# Patient Record
Sex: FEMALE | Race: WHITE | NOT HISPANIC OR LATINO
[De-identification: names, ages, dates, MRNs, and addresses within clinical notes are randomized per-mention and may not be internally consistent; named-entity substitution may affect disease eponyms.]

---

## 2018-06-12 ENCOUNTER — APPOINTMENT (OUTPATIENT)
Dept: PLASTIC SURGERY | Facility: CLINIC | Age: 53
End: 2018-06-12
Payer: SELF-PAY

## 2018-06-12 VITALS
DIASTOLIC BLOOD PRESSURE: 80 MMHG | HEART RATE: 65 BPM | RESPIRATION RATE: 20 BRPM | SYSTOLIC BLOOD PRESSURE: 122 MMHG | OXYGEN SATURATION: 96 %

## 2018-06-12 PROBLEM — Z00.00 ENCOUNTER FOR PREVENTIVE HEALTH EXAMINATION: Status: ACTIVE | Noted: 2018-06-12

## 2018-06-12 PROCEDURE — 11950 SUBQ NJX FILLING MATRL 1CC/<: CPT

## 2018-10-18 ENCOUNTER — APPOINTMENT (OUTPATIENT)
Dept: PLASTIC SURGERY | Facility: CLINIC | Age: 53
End: 2018-10-18
Payer: SELF-PAY

## 2018-10-18 PROCEDURE — 11950 SUBQ NJX FILLING MATRL 1CC/<: CPT

## 2019-01-08 ENCOUNTER — APPOINTMENT (OUTPATIENT)
Dept: PLASTIC SURGERY | Facility: CLINIC | Age: 54
End: 2019-01-08

## 2019-02-15 ENCOUNTER — APPOINTMENT (OUTPATIENT)
Dept: PLASTIC SURGERY | Facility: CLINIC | Age: 54
End: 2019-02-15
Payer: SELF-PAY

## 2019-02-15 PROCEDURE — 11950 SUBQ NJX FILLING MATRL 1CC/<: CPT | Mod: 59

## 2019-02-20 NOTE — PROCEDURE
[FreeTextEntry1] : rhytids  [FreeTextEntry2] : radiesse and botox  [FreeTextEntry6] : VOLODYMYR MANCINI is complaining of dynamic rhytids and laxity with jowls of the face and desires botulinum toxin and filler radiesse for temporary reduction in the rhytids and laxity .  The risks benefits alternatives limitations and permanent scars were outlined with her \par \par Please see the scanned face sheet for lot and dose information.\par

## 2019-02-20 NOTE — ASSESSMENT
[FreeTextEntry1] : VOLODYMYR MANCINI  was here for procedural Botox and radiesse injections.  She  tolerated the procedures well and will return for next dose in 4-5 months.  Instructions were reviewed.\par

## 2019-04-01 ENCOUNTER — APPOINTMENT (OUTPATIENT)
Dept: PLASTIC SURGERY | Facility: CLINIC | Age: 54
End: 2019-04-01
Payer: COMMERCIAL

## 2019-04-01 PROCEDURE — 15789 CHEMICAL PEEL FACIAL DERMAL: CPT | Mod: 78

## 2019-09-17 ENCOUNTER — APPOINTMENT (OUTPATIENT)
Dept: PLASTIC SURGERY | Facility: CLINIC | Age: 54
End: 2019-09-17
Payer: SELF-PAY

## 2019-09-17 PROCEDURE — 11950 SUBQ NJX FILLING MATRL 1CC/<: CPT

## 2021-01-04 ENCOUNTER — APPOINTMENT (OUTPATIENT)
Dept: NEUROSURGERY | Facility: CLINIC | Age: 56
End: 2021-01-04
Payer: COMMERCIAL

## 2021-01-04 PROCEDURE — 99072 ADDL SUPL MATRL&STAF TM PHE: CPT

## 2021-01-04 PROCEDURE — 99204 OFFICE O/P NEW MOD 45 MIN: CPT

## 2021-02-05 DIAGNOSIS — H93.A9 PULSATILE TINNITUS, UNSPECIFIED EAR: ICD-10-CM

## 2021-02-23 ENCOUNTER — APPOINTMENT (OUTPATIENT)
Dept: NEUROSURGERY | Facility: CLINIC | Age: 56
End: 2021-02-23

## 2021-04-01 ENCOUNTER — APPOINTMENT (OUTPATIENT)
Dept: PLASTIC SURGERY | Facility: CLINIC | Age: 56
End: 2021-04-01
Payer: SELF-PAY

## 2021-04-01 PROCEDURE — 11950 SUBQ NJX FILLING MATRL 1CC/<: CPT

## 2021-04-01 NOTE — ASSESSMENT
[FreeTextEntry1] : VOLODYMYR MANCINI  was here for procedural Botox injection.  She  tolerated the procedure well and will return for next dose in 4-5 months.  Instructions were reviewed.\par Aseptic administration of botox to indicated areas of the face.  See external sheet for lot and dose information\par

## 2021-04-01 NOTE — PROCEDURE
[FreeTextEntry6] : VOLODYMYR MANCINI is complaining of dynamic rhytids of the face and desires botulinum toxin for temporary reduction in these rhytids.  The risks benefits alternatives limitations and permanent scars were outlined with her . Under aseptic conditions, Botulinum Toxin was administered in the desired area. Please see face sheet for lot , site and dose information. \par \par Please see the scanned face sheet for lot and dose information.\par

## 2021-10-20 ENCOUNTER — NON-APPOINTMENT (OUTPATIENT)
Age: 56
End: 2021-10-20

## 2021-11-02 ENCOUNTER — APPOINTMENT (OUTPATIENT)
Dept: PLASTIC SURGERY | Facility: CLINIC | Age: 56
End: 2021-11-02
Payer: SELF-PAY

## 2021-11-02 DIAGNOSIS — L98.8 OTHER SPECIFIED DISORDERS OF THE SKIN AND SUBCUTANEOUS TISSUE: ICD-10-CM

## 2021-11-02 PROCEDURE — 64612 DESTROY NERVE FACE MUSCLE: CPT

## 2021-11-02 RX ORDER — FLUTICASONE PROPIONATE 50 UG/1
50 SPRAY, METERED NASAL
Qty: 48 | Refills: 0 | Status: ACTIVE | COMMUNITY
Start: 2021-10-19

## 2021-11-15 NOTE — PROCEDURE
[FreeTextEntry6] : VOLODYMYR MANCINI is complaining of dynamic rhytids of the face and desires botulinum toxin for temporary reduction in these rhytids.  The risks benefits alternatives limitations and permanent scars were outlined with her . Under aseptic conditions, Botulinum Toxin was administered in the desired area. Please see face sheet for lot , site and dose information. \par \par Please see the scanned face sheet for lot and dose information. Aseptic administration of botox to indicated areas of the face.  See external sheet for lot and dose information

## 2021-11-15 NOTE — ASSESSMENT
[FreeTextEntry1] : VOLODYMYR MANCINI  was here for procedural Botox injection.  She  tolerated the procedure well and will return for next dose in 4-5 months.  Instructions were reviewed.

## 2022-12-05 ENCOUNTER — NON-APPOINTMENT (OUTPATIENT)
Age: 57
End: 2022-12-05

## 2022-12-07 ENCOUNTER — APPOINTMENT (OUTPATIENT)
Dept: GASTROENTEROLOGY | Facility: CLINIC | Age: 57
End: 2022-12-07

## 2022-12-07 ENCOUNTER — TRANSCRIPTION ENCOUNTER (OUTPATIENT)
Age: 57
End: 2022-12-07

## 2022-12-07 VITALS
OXYGEN SATURATION: 98 % | HEIGHT: 66 IN | SYSTOLIC BLOOD PRESSURE: 130 MMHG | HEART RATE: 87 BPM | DIASTOLIC BLOOD PRESSURE: 80 MMHG | BODY MASS INDEX: 30.53 KG/M2 | WEIGHT: 190 LBS

## 2022-12-07 DIAGNOSIS — R19.7 DIARRHEA, UNSPECIFIED: ICD-10-CM

## 2022-12-07 PROCEDURE — 99204 OFFICE O/P NEW MOD 45 MIN: CPT

## 2022-12-07 RX ORDER — ALBUTEROL SULFATE 90 UG/1
108 (90 BASE) INHALANT RESPIRATORY (INHALATION)
Qty: 8 | Refills: 0 | Status: ACTIVE | COMMUNITY
Start: 2022-07-21

## 2022-12-07 RX ORDER — METFORMIN ER 500 MG 500 MG/1
500 TABLET ORAL
Qty: 180 | Refills: 0 | Status: DISCONTINUED | COMMUNITY
Start: 2021-04-12 | End: 2022-12-07

## 2022-12-07 RX ORDER — AMOXICILLIN AND CLAVULANATE POTASSIUM 875; 125 MG/1; MG/1
875-125 TABLET, COATED ORAL
Qty: 14 | Refills: 0 | Status: DISCONTINUED | COMMUNITY
Start: 2021-10-06 | End: 2022-12-07

## 2022-12-07 RX ORDER — ATORVASTATIN CALCIUM 10 MG/1
10 TABLET, FILM COATED ORAL
Qty: 90 | Refills: 0 | Status: DISCONTINUED | COMMUNITY
Start: 2021-05-28 | End: 2022-12-07

## 2022-12-07 RX ORDER — AZITHROMYCIN 250 MG/1
250 TABLET, FILM COATED ORAL
Qty: 6 | Refills: 0 | Status: DISCONTINUED | COMMUNITY
Start: 2021-06-17 | End: 2022-12-07

## 2022-12-07 NOTE — PHYSICAL EXAM
[Alert] : alert [Normal Voice/Communication] : normal voice/communication [Healthy Appearing] : healthy appearing [No Acute Distress] : no acute distress [Sclera] : the sclera and conjunctiva were normal [Hearing Threshold Finger Rub Not [place]] : hearing was normal [Normal Lips/Gums] : the lips and gums were normal [Oropharynx] : the oropharynx was normal [Normal Appearance] : the appearance of the neck was normal [No Neck Mass] : no neck mass was observed [No Respiratory Distress] : no respiratory distress [Heart Rate And Rhythm] : heart rate was normal and rhythm regular [Normal PMI] : the apical impulse was abnormal [Bowel Sounds] : normal bowel sounds [No Masses] : no abdominal mass palpated [Abdomen Soft] : soft [] : no hepatosplenomegaly [Abnormal Walk] : normal gait [Normal Color / Pigmentation] : normal skin color and pigmentation [Oriented To Time, Place, And Person] : oriented to person, place, and time [de-identified] : mild suprapubic ttp, no r/g

## 2022-12-07 NOTE — HISTORY OF PRESENT ILLNESS
[FreeTextEntry1] : 57 year F hld, dm2, asthma, PDA s/p surgical repair as a child, tonsillectomy, tummy tuck 10 years ago , HH/GERD, diverticulosis presents for evaluation of LLQ abd pain and GERD. She is seen at the request of Dr. Rhina Beltran. \par \par GERD x 10 years gets better and worse-regurgitation, wakes up with sore throat\par its been worse since last week. \par She take PPI 40 mg in AM and famotidine 20 mg QHS off and on for 2 years. tried to stop ppi, but gets recurrent hb\par she has been able to come off of it for 1-2 weeks. \par she eats a healthy diet, works out regularly. \par \par left sided pain x 2-3 weeks, comes and goes, better when eating. no change in pain with bm. \par increased stool frequency in last 2-3 weeks, 3-4 x per day, her baseline is 1 x per day. \par she was having formed stool up until 3 days ago when she started having watery stool.  She was seen in ED 3 days ago (see below) and did not start having watery stool until after she received oral contrast. \par no brbpr/melena\par \par \par colonoscopy 2018- colon polyp, told to repeat in 2 years\par Colonoscopy spring 2021, Dr. Arreaga,- screening, normal. \par she was coughing a lot while under anesthesia, doctor said he could not see well. told to repeat in 5 years. \par she did not do split prep. finished at 2-3 am, procedure was at 7 am. \par told to have it next time in a hospital  \par \par EGD 2019- Dr Arreaga- showed HH she was on a PPI for 4-5 years when the procedure was done. \par \par Sees ENT, Dr Foster- for 10 years- ear pain, tmj, looked in her throat and saw acid reflux. started her on ppi and famotidine. \par \par weight and appetite stable. \par Had Abx for sinus infection 3 weeks ago. \par \par she was in ED at [facility] 3 days ago- for left sided pain, she had previously seen PCP for this as well as exhaustion. \par labs with pmd last week were normal. \par seen in UC, then referred to ED. \par labs at [facility] were wnl\par \par CT A/P with IVC: abd pain, LLQ pain \par moderate sized HH, hepatic steatosis, cholelithiasis and or sludge with possibility of calcified gb wall. \par diverticulosis, short segment of question wall thickening vs. incomplete distension within the sigmoid colon could represent mild diverticulitis. moderate stool distention throughout portions of the colon. \par several scattered small mesenteric/rp ln nonspecific. \par \par discharged on amoxicillin 875 and famotidine 20 mg bid. She has only taken 1 Augmentin since discharge. \par Does not feel different since discharge. \par  \par \par denies NSAID use\par \par

## 2022-12-07 NOTE — ASSESSMENT
[FreeTextEntry1] : LLQ pain, CT scan equivocal for diverticulitis. \par -with diarrhea also consider colitis insetting of recent abx, check stool studies. \par -start Cipro/Flagyl\par -f/u in 1 week. \par \par possible gb calcifications, gallstones/sludge on CT- patient referred to surgeon to consider cholecystectomy. \par \par GERD/HH-\par -chronic\par -continue current daily PPI, BID H2B\par -obtain prior EGD records\par \par Colon cancer screening- obtain prior records.

## 2022-12-08 LAB — GI PCR PANEL: NOT DETECTED

## 2022-12-11 LAB
C DIFF TOX B STL QL CT TISS CULT: NORMAL
Lab: NORMAL

## 2022-12-14 ENCOUNTER — APPOINTMENT (OUTPATIENT)
Dept: GASTROENTEROLOGY | Facility: CLINIC | Age: 57
End: 2022-12-14

## 2022-12-14 LAB — CALPROTECTIN FECAL: <16 UG/G

## 2022-12-14 PROCEDURE — 99214 OFFICE O/P EST MOD 30 MIN: CPT

## 2022-12-14 NOTE — ASSESSMENT
[FreeTextEntry1] : LLQ pain, CT scan equivocal for diverticulitis. , improved with Cipro/flagyl. , stool studies normal. benign abd exam\par -colonoscopy in 6-8 weeks , f/u of diverticulitis, r/o mass. \par \par possible gb calcifications, gallstones/sludge on CT- patient referred to surgeon to consider cholecystectomy. seeing Dr. Dao later this month. \par \par GERD/HH-\par -chronic\par -continue current daily PPI, BID H2B\par -obtain prior EGD records to determine timing of next EGD. \par \par Colon cancer screening- obtain prior records.

## 2022-12-14 NOTE — PHYSICAL EXAM
[Alert] : alert [Normal Voice/Communication] : normal voice/communication [Healthy Appearing] : healthy appearing [No Acute Distress] : no acute distress [Sclera] : the sclera and conjunctiva were normal [Hearing Threshold Finger Rub Not [place]] : hearing was normal [Normal Lips/Gums] : the lips and gums were normal [Oropharynx] : the oropharynx was normal [Normal Appearance] : the appearance of the neck was normal [No Neck Mass] : no neck mass was observed [No Respiratory Distress] : no respiratory distress [Heart Rate And Rhythm] : heart rate was normal and rhythm regular [Normal PMI] : the apical impulse was abnormal [Bowel Sounds] : normal bowel sounds [No Masses] : no abdominal mass palpated [Abdomen Soft] : soft [] : no hepatosplenomegaly [Abnormal Walk] : normal gait [Normal Color / Pigmentation] : normal skin color and pigmentation [Oriented To Time, Place, And Person] : oriented to person, place, and time [de-identified] : mild suprapubic ttp, no r/g

## 2022-12-14 NOTE — HISTORY OF PRESENT ILLNESS
[FreeTextEntry1] : 57 year F hld, dm2, asthma, PDA s/p surgical repair as a child, tonsillectomy, tummy tuck 10 years ago , HH/GERD, diverticulosis presents for evaluation of LLQ abd pain and GERD. She is seen at the request of Dr. Rhina Beltran. \par \par she has taken Cipro and Flagyl since last week. pain is much better, not 100% resolved, \par 3/10  last week 6-7/10. \par she is having 3 bm per day (baseline 1 ) not formed per day. not watery any longer\par GERD is now better controlled. \par eating normally. \par weight/appetite stable. \par \par prior hx \par GERD x > 10 years gets better and worse-regurgitation, wakes up with sore throat\par its been worse since last week. \par She take PPI 40 mg in AM and famotidine 40 mg QHS off and on for 2 years. tried to stop ppi, but gets recurrent hb\par she has been able to come off of it for 1-2 weeks. \par she eats a healthy diet, works out regularly. \par she gained 20 lbs in the last 2 years, unable to lose weight\par \par left sided pain x 2-3 weeks, comes and goes, better when eating. no change in pain with bm. \par increased stool frequency in last 2-3 weeks, 3-4 x per day, her baseline is 1 x per day. \par she was having formed stool up until 3 days ago when she started having watery stool.  She was seen in ED 3 days ago (see below) and did not start having watery stool until after she received oral contrast. \par no brbpr/melena\par \par \par colonoscopy 2018- colon polyp, told to repeat in 2 years\par Colonoscopy spring 2021, Dr. Arreaga,- screening, normal. \par she was coughing a lot while under anesthesia, doctor said he could not see well. told to repeat in 5 years. \par she did not do split prep. finished at 2-3 am, procedure was at 7 am. \par told to have it next time in a hospital  \par \par EGD 2019- Dr Arreaga- showed HH she was on a PPI for 4-5 years when the procedure was done. \par \par Sees ENT, Dr Foster- for 10 years- ear pain, tmj, looked in her throat and saw acid reflux. started her on ppi and famotidine. \par \par weight and appetite stable. \par Had Abx for sinus infection 3 weeks ago. \par \par she was in ED at [facility] 3 days ago- for left sided pain, she had previously seen PCP for this as well as exhaustion. \par labs with pmd last week were normal. \par seen in UC, then referred to ED. \par labs at [facility] were wnl\par \par CT A/P with IVC: abd pain, LLQ pain \par moderate sized HH, hepatic steatosis, cholelithiasis and or sludge with possibility of calcified gb wall. \par diverticulosis, short segment of question wall thickening vs. incomplete distension within the sigmoid colon could represent mild diverticulitis. moderate stool distention throughout portions of the colon. \par several scattered small mesenteric/rp ln nonspecific. \par \par discharged on amoxicillin 875 and famotidine 20 mg bid. She has only taken 1 Augmentin since discharge. \par Does not feel different since discharge. \par  \par \par denies NSAID use\par \par

## 2022-12-21 ENCOUNTER — NON-APPOINTMENT (OUTPATIENT)
Age: 57
End: 2022-12-21

## 2022-12-21 ENCOUNTER — APPOINTMENT (OUTPATIENT)
Dept: SURGERY | Facility: CLINIC | Age: 57
End: 2022-12-21

## 2022-12-21 VITALS
HEART RATE: 77 BPM | OXYGEN SATURATION: 98 % | BODY MASS INDEX: 31.47 KG/M2 | WEIGHT: 195.8 LBS | HEIGHT: 66 IN | SYSTOLIC BLOOD PRESSURE: 122 MMHG | DIASTOLIC BLOOD PRESSURE: 64 MMHG | TEMPERATURE: 98.2 F

## 2022-12-21 DIAGNOSIS — S03.00XA DISLOCATION OF JAW, UNSPECIFIED SIDE, INITIAL ENCOUNTER: ICD-10-CM

## 2022-12-21 DIAGNOSIS — Z83.79 FAMILY HISTORY OF OTHER DISEASES OF THE DIGESTIVE SYSTEM: ICD-10-CM

## 2022-12-21 DIAGNOSIS — E78.5 HYPERLIPIDEMIA, UNSPECIFIED: ICD-10-CM

## 2022-12-21 DIAGNOSIS — K80.20 CALCULUS OF GALLBLADDER W/OUT CHOLECYSTITIS W/OUT OBSTRUCTION: ICD-10-CM

## 2022-12-21 DIAGNOSIS — F41.9 ANXIETY DISORDER, UNSPECIFIED: ICD-10-CM

## 2022-12-21 DIAGNOSIS — K82.8 OTHER SPECIFIED DISEASES OF GALLBLADDER: ICD-10-CM

## 2022-12-21 DIAGNOSIS — E11.9 TYPE 2 DIABETES MELLITUS W/OUT COMPLICATIONS: ICD-10-CM

## 2022-12-21 DIAGNOSIS — Z72.89 OTHER PROBLEMS RELATED TO LIFESTYLE: ICD-10-CM

## 2022-12-21 DIAGNOSIS — Z87.891 PERSONAL HISTORY OF NICOTINE DEPENDENCE: ICD-10-CM

## 2022-12-21 DIAGNOSIS — Z80.3 FAMILY HISTORY OF MALIGNANT NEOPLASM OF BREAST: ICD-10-CM

## 2022-12-21 PROCEDURE — 99203 OFFICE O/P NEW LOW 30 MIN: CPT

## 2022-12-21 RX ORDER — METRONIDAZOLE 500 MG/1
500 TABLET ORAL 3 TIMES DAILY
Qty: 21 | Refills: 0 | Status: COMPLETED | COMMUNITY
Start: 2022-12-07 | End: 2022-12-21

## 2022-12-21 RX ORDER — BLOOD-GLUCOSE METER
W/DEVICE KIT MISCELLANEOUS
Qty: 1 | Refills: 0 | Status: ACTIVE | COMMUNITY
Start: 2021-05-23

## 2022-12-21 RX ORDER — SERTRALINE HYDROCHLORIDE 100 MG/1
100 TABLET, FILM COATED ORAL
Refills: 0 | Status: ACTIVE | COMMUNITY
Start: 2021-04-12

## 2022-12-21 RX ORDER — ATORVASTATIN CALCIUM 20 MG/1
20 TABLET, FILM COATED ORAL
Refills: 0 | Status: ACTIVE | COMMUNITY
Start: 2022-07-21

## 2022-12-21 RX ORDER — BLOOD SUGAR DIAGNOSTIC
STRIP MISCELLANEOUS
Qty: 200 | Refills: 0 | Status: ACTIVE | COMMUNITY
Start: 2021-07-29

## 2022-12-21 RX ORDER — FAMOTIDINE 20 MG/1
20 TABLET, FILM COATED ORAL
Qty: 60 | Refills: 0 | Status: COMPLETED | COMMUNITY
Start: 2022-12-05 | End: 2022-12-21

## 2022-12-21 RX ORDER — FAMOTIDINE 40 MG/1
40 TABLET, FILM COATED ORAL
Qty: 90 | Refills: 0 | Status: ACTIVE | COMMUNITY
Start: 2022-01-11

## 2022-12-21 RX ORDER — CIPROFLOXACIN HYDROCHLORIDE 500 MG/1
500 TABLET, FILM COATED ORAL
Qty: 20 | Refills: 0 | Status: COMPLETED | COMMUNITY
Start: 2022-12-07 | End: 2022-12-21

## 2022-12-21 RX ORDER — SODIUM SULFATE, POTASSIUM SULFATE AND MAGNESIUM SULFATE 1.6; 3.13; 17.5 G/177ML; G/177ML; G/177ML
17.5-3.13-1.6 SOLUTION ORAL
Qty: 354 | Refills: 0 | Status: COMPLETED | COMMUNITY
Start: 2022-12-14 | End: 2022-12-21

## 2022-12-21 RX ORDER — METFORMIN HYDROCHLORIDE 1000 MG/1
1000 TABLET, COATED ORAL
Qty: 180 | Refills: 0 | Status: ACTIVE | COMMUNITY
Start: 2022-07-21

## 2022-12-21 NOTE — PLAN
[FreeTextEntry1] : Is a 57-year-old female who has plans for trip to [place] in the near future.  She developed left lower quadrant pain and went to an urgent care center associated with [facility].  There she had a CAT scan of abdomen pelvis which suggested the possibility of mild diverticulitis but also showed gallstones with a possibility of a calcified gallbladder wall.  She is referred to me by her gastroenterologist for cholecystectomy.  She denies fatty food intolerance.  She is not on antibiotics.  She has no diarrhea.  She has had no other symptoms.\par \par Review of systems: General-denies fatigue.  Cardiac- denies chest pain.  Respiratory- denies shortness of breath.  GI-denies nausea or vomiting.  -denies dysuria.  Musculoskeletal-denies back pain.  Psychiatric-denies hearing voices.\par \par Physical exam: Head-normocephalic.  Sclera are anicteric.  Neck-supple.  Chest-clear.  Abdomen-soft, nontender, nondistended.  Extremities-no edema.  She has a scar from an abdominoplasty.  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  The radiologist recommends a ultrasound to better evaluate if this is indeed true.  Porcelain gallbladder is to carry a risk of potential malignancy.  If this is a true porcelain gallbladder meaning that it is unable to be grasped then I would refer her to either surgical oncology or hepatobiliary surgery.  If there are only some calcifications in the gallbladder wall that I can proceed to schedule her for surgery at [facility].  I do not believe that she has symptomatic cholelithiasis but the calcifications are an indication for surgery.  The patient will follow me after her ultrasound and understands at this is potentially a premalignant condition.

## 2023-01-05 DIAGNOSIS — K57.32 DIVERTICULITIS OF LARGE INTESTINE W/OUT PERFORATION OR ABSCESS W/OUT BLEEDING: ICD-10-CM

## 2023-01-06 ENCOUNTER — RESULT REVIEW (OUTPATIENT)
Age: 58
End: 2023-01-06

## 2023-01-11 ENCOUNTER — NON-APPOINTMENT (OUTPATIENT)
Age: 58
End: 2023-01-11

## 2023-01-20 ENCOUNTER — RESULT REVIEW (OUTPATIENT)
Age: 58
End: 2023-01-20

## 2023-01-22 ENCOUNTER — RESULT REVIEW (OUTPATIENT)
Age: 58
End: 2023-01-22

## 2023-01-23 ENCOUNTER — APPOINTMENT (OUTPATIENT)
Dept: GASTROENTEROLOGY | Facility: HOSPITAL | Age: 58
End: 2023-01-23

## 2023-04-10 DIAGNOSIS — R10.32 LEFT LOWER QUADRANT PAIN: ICD-10-CM

## 2023-04-10 RX ORDER — PANTOPRAZOLE 40 MG/1
40 TABLET, DELAYED RELEASE ORAL
Qty: 60 | Refills: 2 | Status: ACTIVE | COMMUNITY
Start: 2022-02-22 | End: 1900-01-01

## 2023-04-19 ENCOUNTER — RESULT REVIEW (OUTPATIENT)
Age: 58
End: 2023-04-19

## 2023-04-26 ENCOUNTER — APPOINTMENT (OUTPATIENT)
Dept: GASTROENTEROLOGY | Facility: CLINIC | Age: 58
End: 2023-04-26
Payer: COMMERCIAL

## 2023-04-26 VITALS
HEART RATE: 88 BPM | OXYGEN SATURATION: 98 % | WEIGHT: 187 LBS | BODY MASS INDEX: 30.05 KG/M2 | HEIGHT: 66 IN | SYSTOLIC BLOOD PRESSURE: 120 MMHG | DIASTOLIC BLOOD PRESSURE: 80 MMHG

## 2023-04-26 DIAGNOSIS — R10.32 LEFT LOWER QUADRANT PAIN: ICD-10-CM

## 2023-04-26 DIAGNOSIS — K21.9 GASTRO-ESOPHAGEAL REFLUX DISEASE W/OUT ESOPHAGITIS: ICD-10-CM

## 2023-04-26 DIAGNOSIS — G89.29 LEFT LOWER QUADRANT PAIN: ICD-10-CM

## 2023-04-26 PROCEDURE — 99214 OFFICE O/P EST MOD 30 MIN: CPT

## 2023-04-26 RX ORDER — SODIUM SULFATE, POTASSIUM SULFATE AND MAGNESIUM SULFATE 1.6; 3.13; 17.5 G/177ML; G/177ML; G/177ML
17.5-3.13-1.6 SOLUTION ORAL
Qty: 354 | Refills: 0 | Status: DISCONTINUED | COMMUNITY
Start: 2023-01-05 | End: 2023-04-26

## 2023-04-26 NOTE — ASSESSMENT
[FreeTextEntry1] : LLQ pain, started after surgery, CT A/P does not show diverticulitis or cause of pain, ?a/w surgical incision. suspect MSK. \par -Abd US ordered in case pain persists\par -she will forward me labs with PMD next week. \par \par \par GERD/HH-\par -chronic\par -continue current daily PPI, BID H2B\par

## 2023-04-26 NOTE — PHYSICAL EXAM
[Alert] : alert [Normal Voice/Communication] : normal voice/communication [Healthy Appearing] : healthy appearing [No Acute Distress] : no acute distress [Sclera] : the sclera and conjunctiva were normal [Hearing Threshold Finger Rub Not [place]] : hearing was normal [Normal Lips/Gums] : the lips and gums were normal [Oropharynx] : the oropharynx was normal [Normal Appearance] : the appearance of the neck was normal [No Neck Mass] : no neck mass was observed [No Respiratory Distress] : no respiratory distress [Heart Rate And Rhythm] : heart rate was normal and rhythm regular [Normal PMI] : the apical impulse was abnormal [Bowel Sounds] : normal bowel sounds [No Masses] : no abdominal mass palpated [Abdomen Soft] : soft [] : no hepatosplenomegaly [Abnormal Walk] : normal gait [Normal Color / Pigmentation] : normal skin color and pigmentation [Oriented To Time, Place, And Person] : oriented to person, place, and time [de-identified] : mild suprapubic ttp, no r/g

## 2023-04-26 NOTE — HISTORY OF PRESENT ILLNESS
Infant is having difficulty Breastfeeding. Mother is pumping and supplementing with EBM/formula. Enc f/u with [facility] for BF support after discharge home. Reviewed discharge instructions with mother. Questions answered  ID bands matched with mother.  DANYEL [FreeTextEntry1] : 57 year F hld, dm2, asthma, PDA s/p surgical repair as a child, tonsillectomy, tummy tuck 10 years ago , HH/GERD, diverticulosis presents for follow up of LLQ pain and GERD. \par \par \par \par \par EGD/flex sig  01/2023- reflux esophagitis, recall for colonoscopy in 7 years due to colonoscopy 02/2022 with Dr. Arreaga, \par \par s/p choley with Dr. Arora at [facility], reportedly benign path \par \par f/u phone conversation 4/10/23- patient had LLQ pain starting after her surgery, felt similar to diverticulitis. She also had worsening GERD\par \par CT A/P with po and IVC: 4/20/23" \par \par  IMPRESSION:\par  No acute findings to explain the patient's symptoms.\par \par GERD now much better- back on daily PPI. takes famotidine at night. \par Still has LLQ pain, worse after her gym workout. Now seeing nutritionist and working on healthy diet and regular exercise. \par pain not severe, does not interfere with exercise, sleep or eating. \par some fecal urgency since choleu\par per patient, she discussed pain with surgeon- he advised US if pain persisted. \par \par \par \par \par prior hx \par GERD x > 10 years gets better and worse-regurgitation, wakes up with sore throat\par its been worse since last week. \par She take PPI 40 mg in AM and famotidine 40 mg QHS off and on for 2 years. tried to stop ppi, but gets recurrent hb\par she has been able to come off of it for 1-2 weeks. \par she eats a healthy diet, works out regularly. \par she gained 20 lbs in the last 2 years, unable to lose weight\par \par left sided pain x 2-3 weeks, comes and goes, better when eating. no change in pain with bm. \par increased stool frequency in last 2-3 weeks, 3-4 x per day, her baseline is 1 x per day. \par she was having formed stool up until 3 days ago when she started having watery stool.  She was seen in ED 3 days ago (see below) and did not start having watery stool until after she received oral contrast. \par no brbpr/melena\par \par \par colonoscopy 2018- colon polyp, told to repeat in 2 years\par Colonoscopy spring 2021, Dr. Arreaga,- screening, normal. \par she was coughing a lot while under anesthesia, doctor said he could not see well. told to repeat in 5 years. \par she did not do split prep. finished at 2-3 am, procedure was at 7 am. \par told to have it next time in a hospital  \par \par EGD 2019- Dr Arreaga- showed HH she was on a PPI for 4-5 years when the procedure was done. \par \par Sees ENT, Dr Foster- for 10 years- ear pain, tmj, looked in her throat and saw acid reflux. started her on ppi and famotidine. \par \par weight and appetite stable. \par Had Abx for sinus infection 3 weeks ago. \par \par she was in ED at [facility] 3 days ago- for left sided pain, she had previously seen PCP for this as well as exhaustion. \par labs with pmd last week were normal. \par seen in UC, then referred to ED. \par labs at [facility] were wnl\par \par CT A/P with IVC: abd pain, LLQ pain \par moderate sized HH, hepatic steatosis, cholelithiasis and or sludge with possibility of calcified gb wall. \par diverticulosis, short segment of question wall thickening vs. incomplete distension within the sigmoid colon could represent mild diverticulitis. moderate stool distention throughout portions of the colon. \par several scattered small mesenteric/rp ln nonspecific. \par \par discharged on amoxicillin 875 and famotidine 20 mg bid. She has only taken 1 Augmentin since discharge. \par Does not feel different since discharge. \par  \par \par denies NSAID use\par \par

## 2023-11-08 ENCOUNTER — APPOINTMENT (OUTPATIENT)
Dept: GASTROENTEROLOGY | Facility: CLINIC | Age: 58
End: 2023-11-08

## 2024-04-17 ENCOUNTER — APPOINTMENT (OUTPATIENT)
Dept: GASTROENTEROLOGY | Facility: CLINIC | Age: 59
End: 2024-04-17